# Patient Record
Sex: MALE | Race: WHITE | Employment: FULL TIME | URBAN - METROPOLITAN AREA
[De-identification: names, ages, dates, MRNs, and addresses within clinical notes are randomized per-mention and may not be internally consistent; named-entity substitution may affect disease eponyms.]

---

## 2022-05-31 ENCOUNTER — HOSPITAL ENCOUNTER (EMERGENCY)
Age: 27
Discharge: HOME | End: 2022-05-31
Attending: EMERGENCY MEDICINE
Payer: COMMERCIAL

## 2022-05-31 ENCOUNTER — APPOINTMENT (OUTPATIENT)
Dept: GENERAL RADIOLOGY | Age: 27
End: 2022-05-31
Payer: COMMERCIAL

## 2022-05-31 VITALS
HEIGHT: 71 IN | BODY MASS INDEX: 24.5 KG/M2 | TEMPERATURE: 98.7 F | WEIGHT: 175 LBS | DIASTOLIC BLOOD PRESSURE: 68 MMHG | HEART RATE: 77 BPM | RESPIRATION RATE: 20 BRPM | OXYGEN SATURATION: 98 % | SYSTOLIC BLOOD PRESSURE: 100 MMHG

## 2022-05-31 DIAGNOSIS — R06.02 SHORTNESS OF BREATH: ICD-10-CM

## 2022-05-31 DIAGNOSIS — J06.9 ACUTE URI: Primary | ICD-10-CM

## 2022-05-31 LAB
FLUAV AG NPH QL IA: NEGATIVE
FLUBV AG NPH QL IA: NEGATIVE
SARS-COV-2 RDRP RESP QL NAA+PROBE: NOT DETECTED
SOURCE: NORMAL
SPECIMEN SOURCE: NORMAL

## 2022-05-31 PROCEDURE — 87804 INFLUENZA ASSAY W/OPTIC: CPT

## 2022-05-31 PROCEDURE — 71045 X-RAY EXAM CHEST 1 VIEW: CPT

## 2022-05-31 PROCEDURE — 99284 EMERGENCY DEPT VISIT MOD MDM: CPT

## 2022-05-31 PROCEDURE — 87635 SARS-COV-2 COVID-19 AMP PRB: CPT

## 2022-05-31 RX ORDER — BENZONATATE 200 MG/1
200 CAPSULE ORAL 3 TIMES DAILY PRN
Qty: 30 CAPSULE | Refills: 0 | Status: SHIPPED | OUTPATIENT
Start: 2022-05-31 | End: 2022-06-07

## 2022-05-31 ASSESSMENT — ENCOUNTER SYMPTOMS
SHORTNESS OF BREATH: 1
COUGH: 1
VOMITING: 0
RHINORRHEA: 1
EYE DISCHARGE: 0
NAUSEA: 0
ABDOMINAL PAIN: 0
SORE THROAT: 1
WHEEZING: 0
SINUS CONGESTION: 1

## 2022-05-31 ASSESSMENT — LIFESTYLE VARIABLES
HOW MANY STANDARD DRINKS CONTAINING ALCOHOL DO YOU HAVE ON A TYPICAL DAY: 1 OR 2
HOW OFTEN DO YOU HAVE A DRINK CONTAINING ALCOHOL: MONTHLY OR LESS

## 2022-05-31 ASSESSMENT — PAIN - FUNCTIONAL ASSESSMENT: PAIN_FUNCTIONAL_ASSESSMENT: NONE - DENIES PAIN

## 2022-05-31 NOTE — ED NOTES
I have reviewed discharge instructions with the patient. The patient verbalized understanding. Patient left ED via Discharge Method: ambulatory to Home with self. Opportunity for questions and clarification provided. Patient given 1 scripts. To continue your aftercare when you leave the hospital, you may receive an automated call from our care team to check in on how you are doing. This is a free service and part of our promise to provide the best care and service to meet your aftercare needs.  If you have questions, or wish to unsubscribe from this service please call [phone]. Thank you for Choosing our [address] [place] [address] Emergency Department.         Delmis Sheridan RN  05/31/22 [id number]

## 2022-05-31 NOTE — Clinical Note
Esteban Bowling was seen and treated in our emergency department on 5/31/2022. He may return to work on 06/03/2022. If you have any questions or concerns, please don't hesitate to call.       Bharati Anders MD

## 2022-05-31 NOTE — ED TRIAGE NOTES
Pt ambulatory from lobby to triage. Pt here c/o SOB. Pt states he began to have a fever, fatigue and joint soreness on Friday, felt a little better as the weekend went on, but it came back yesterday. Pt has also had a productive cough. Pt has not taken any medications at home.

## 2022-05-31 NOTE — Clinical Note
Mckinley Maurer was seen and treated in our emergency department on 5/31/2022. He may return to work on 06/03/2022. If you have any questions or concerns, please don't hesitate to call.       Rashida Barajas MD

## 2022-05-31 NOTE — ED PROVIDER NOTES
[facility] Emergency Department Provider Note                   PCP:                None Provider               Age: 23 y.o. Sex: male       ICD-10-CM    1. Acute URI  J06.9       2. Shortness of breath  R06.02           DISPOSITION Decision To Discharge 05/31/2022 02:02:37 PM       Discharge Medication List as of 5/31/2022  2:28 PM        START taking these medications    Details   benzonatate (TESSALON) 200 MG capsule Take 1 capsule by mouth 3 times daily as needed for Cough, Disp-30 capsule, R-0Print             Orders Placed This Encounter   Procedures    COVID-19, Rapid    Rapid influenza A/B antigens    XR CHEST PA INSPIRATION 1 VW    EKG 12 Lead        MDM  Number of Diagnoses or Management Options  Acute URI: new, needed workup  Shortness of breath: new, needed workup     Amount and/or Complexity of Data Reviewed  Clinical lab tests: ordered and reviewed  Tests in the radiology section of CPT®: ordered and reviewed  Tests in the medicine section of CPT®: ordered and reviewed (ECG interpretation for ECG dated 31 May 2022 at 1:29 PM: ECG reveals normal sinus rhythm rate 79 bpm with normal OK and QTc intervals no evidence of ectopy or ischemia. Axis is also normal.  Unremarkable ECG. Angelika Khan MD  )  Review and summarize past medical records: yes    Risk of Complications, Morbidity, and/or Mortality  Presenting problems: moderate  Diagnostic procedures: moderate  Management options: moderate    Patient Progress  Patient progress: stable       Yulissa Saenz is a 23 y.o. male who presents to the Emergency Department with chief complaint of    Chief Complaint   Patient presents with    Shortness of Breath      Per nurses notes: \"Pt ambulatory from lobby to triage. Pt here c/o SOB. Pt states he began to have a fever, fatigue and joint soreness on Friday, felt a little better as the weekend went on, but it came back yesterday. Pt has also had a productive cough.  Pt has not taken any medications at home.\"    The history is provided by the patient. Cough  Cough characteristics:  Non-productive  Severity:  Moderate  Onset quality:  Gradual  Duration:  3 days  Timing:  Intermittent  Progression:  Worsening  Chronicity:  New  Smoker: no    Context: upper respiratory infection    Relieved by:  Nothing  Worsened by:  Nothing  Ineffective treatments:  None tried  Associated symptoms: chills, fever, rhinorrhea, shortness of breath, sinus congestion and sore throat    Associated symptoms: no chest pain, no diaphoresis, no ear fullness, no ear pain, no eye discharge, no headaches, no myalgias, no rash, no weight loss and no wheezing      All other systems reviewed and are negative. Review of Systems   Constitutional:  Positive for chills and fever. Negative for diaphoresis and weight loss. HENT:  Positive for congestion, postnasal drip, rhinorrhea and sore throat. Negative for ear pain. Eyes:  Negative for discharge. Respiratory:  Positive for cough and shortness of breath. Negative for wheezing. Cardiovascular:  Negative for chest pain. Gastrointestinal:  Negative for abdominal pain, nausea and vomiting. Musculoskeletal:  Negative for myalgias. Skin:  Negative for rash. Neurological:  Negative for headaches. All other systems reviewed and are negative. History reviewed. No pertinent past medical history. History reviewed. No pertinent surgical history. History reviewed. No pertinent family history. Social Connections: Not on file        Allergies   Allergen Reactions    Penicillins Hives        Vitals signs and nursing note reviewed. No data found. Physical Exam  Vitals and nursing note reviewed. Constitutional:       General: He is not in acute distress. HENT:      Head: Normocephalic and atraumatic.       Right Ear: External ear normal.      Left Ear: External ear normal.      Nose: Nose normal.      Mouth/Throat:      Mouth: Mucous membranes are moist.   Eyes: Extraocular Movements: Extraocular movements intact. Conjunctiva/sclera: Conjunctivae normal.      Pupils: Pupils are equal, round, and reactive to light. Cardiovascular:      Rate and Rhythm: Normal rate and regular rhythm. Heart sounds: No murmur heard. Pulmonary:      Effort: Pulmonary effort is normal.      Breath sounds: Normal breath sounds. Abdominal:      General: Bowel sounds are normal.      Palpations: Abdomen is soft. Tenderness: There is no abdominal tenderness. Musculoskeletal:         General: Normal range of motion. Cervical back: Normal range of motion and neck supple. Skin:     General: Skin is warm and dry. Neurological:      General: No focal deficit present. Mental Status: He is alert and oriented to person, place, and time. Psychiatric:         Mood and Affect: Mood normal.         Behavior: Behavior normal.        Procedures      Results Reviewed:        XR CHEST PA INSPIRATION 1 VW   Final Result      NAD      I discussed the results of all labs, procedures, radiographs, and treatments with the patient and available family. Treatment plan is agreed upon and the patient is ready for discharge. All voiced understanding of the discharge plan and medication instructions or changes as appropriate. Questions about treatment in the ED were answered. All were encouraged to return should symptoms worsen or new problems develop. Voice dictation software was used during the making of this note. This software is not perfect and grammatical and other typographical errors may be present. This note has not been completely proofread for errors.      Medhat Cruz MD  05/31/22 [id number]       Medhat Cruz MD  09/21/22 [id number]

## 2022-09-21 ASSESSMENT — ENCOUNTER SYMPTOMS
SHORTNESS OF BREATH: 1
ABDOMINAL PAIN: 0
COUGH: 1
VOMITING: 0
RHINORRHEA: 1
NAUSEA: 0
WHEEZING: 0
SORE THROAT: 1
SINUS CONGESTION: 1
EYE DISCHARGE: 0